# Patient Record
Sex: FEMALE | Race: WHITE | Employment: OTHER | URBAN - METROPOLITAN AREA
[De-identification: names, ages, dates, MRNs, and addresses within clinical notes are randomized per-mention and may not be internally consistent; named-entity substitution may affect disease eponyms.]

---

## 2019-03-13 ENCOUNTER — OFFICE VISIT (OUTPATIENT)
Dept: FAMILY MEDICINE CLINIC | Age: 69
End: 2019-03-13
Payer: MEDICARE

## 2019-03-13 VITALS
OXYGEN SATURATION: 97 % | HEART RATE: 75 BPM | SYSTOLIC BLOOD PRESSURE: 164 MMHG | BODY MASS INDEX: 23.57 KG/M2 | WEIGHT: 133 LBS | HEIGHT: 63 IN | DIASTOLIC BLOOD PRESSURE: 82 MMHG | RESPIRATION RATE: 18 BRPM | TEMPERATURE: 97.4 F

## 2019-03-13 DIAGNOSIS — R00.2 PALPITATIONS: ICD-10-CM

## 2019-03-13 DIAGNOSIS — R03.0 ELEVATED BLOOD PRESSURE READING: ICD-10-CM

## 2019-03-13 DIAGNOSIS — B88.9 MITE INFESTATION: ICD-10-CM

## 2019-03-13 DIAGNOSIS — R49.0 DYSPHONIA: ICD-10-CM

## 2019-03-13 DIAGNOSIS — Q21.10 ASD (ATRIAL SEPTAL DEFECT): Primary | ICD-10-CM

## 2019-03-13 DIAGNOSIS — F41.9 ANXIETY DISORDER, UNSPECIFIED TYPE: ICD-10-CM

## 2019-03-13 PROCEDURE — 1101F PT FALLS ASSESS-DOCD LE1/YR: CPT | Performed by: FAMILY MEDICINE

## 2019-03-13 PROCEDURE — G8427 DOCREV CUR MEDS BY ELIG CLIN: HCPCS | Performed by: FAMILY MEDICINE

## 2019-03-13 PROCEDURE — G8420 CALC BMI NORM PARAMETERS: HCPCS | Performed by: FAMILY MEDICINE

## 2019-03-13 PROCEDURE — 3017F COLORECTAL CA SCREEN DOC REV: CPT | Performed by: FAMILY MEDICINE

## 2019-03-13 PROCEDURE — 4040F PNEUMOC VAC/ADMIN/RCVD: CPT | Performed by: FAMILY MEDICINE

## 2019-03-13 PROCEDURE — 1036F TOBACCO NON-USER: CPT | Performed by: FAMILY MEDICINE

## 2019-03-13 PROCEDURE — 99214 OFFICE O/P EST MOD 30 MIN: CPT | Performed by: FAMILY MEDICINE

## 2019-03-13 PROCEDURE — G8484 FLU IMMUNIZE NO ADMIN: HCPCS | Performed by: FAMILY MEDICINE

## 2019-03-13 PROCEDURE — G8400 PT W/DXA NO RESULTS DOC: HCPCS | Performed by: FAMILY MEDICINE

## 2019-03-13 PROCEDURE — 1123F ACP DISCUSS/DSCN MKR DOCD: CPT | Performed by: FAMILY MEDICINE

## 2019-03-13 PROCEDURE — 1090F PRES/ABSN URINE INCON ASSESS: CPT | Performed by: FAMILY MEDICINE

## 2019-03-13 ASSESSMENT — PATIENT HEALTH QUESTIONNAIRE - PHQ9
SUM OF ALL RESPONSES TO PHQ QUESTIONS 1-9: 0
2. FEELING DOWN, DEPRESSED OR HOPELESS: 0
SUM OF ALL RESPONSES TO PHQ QUESTIONS 1-9: 0
1. LITTLE INTEREST OR PLEASURE IN DOING THINGS: 0
SUM OF ALL RESPONSES TO PHQ9 QUESTIONS 1 & 2: 0

## 2019-03-16 PROBLEM — F41.9 ANXIETY DISORDER: Status: ACTIVE | Noted: 2019-03-16

## 2019-03-16 PROBLEM — B88.9 MITE INFESTATION: Status: ACTIVE | Noted: 2019-03-16

## 2019-03-20 ENCOUNTER — HOSPITAL ENCOUNTER (OUTPATIENT)
Age: 69
Discharge: HOME | End: 2019-03-20
Payer: MEDICARE

## 2019-03-20 DIAGNOSIS — F41.9 ANXIETY DISORDER, UNSPECIFIED TYPE: ICD-10-CM

## 2019-03-20 DIAGNOSIS — R03.0 ELEVATED BLOOD PRESSURE READING: ICD-10-CM

## 2019-03-20 LAB
ALBUMIN SERPL-MCNC: 4.4 G/DL (ref 3.5–5.2)
ALP BLD-CCNC: 90 U/L (ref 35–104)
ALT SERPL-CCNC: 29 U/L (ref 0–32)
ANION GAP SERPL CALCULATED.3IONS-SCNC: 9 MMOL/L (ref 7–16)
AST SERPL-CCNC: 29 U/L (ref 0–31)
BASOPHILS ABSOLUTE: 0.03 E9/L (ref 0–0.2)
BASOPHILS RELATIVE PERCENT: 0.6 % (ref 0–2)
BILIRUB SERPL-MCNC: 0.4 MG/DL (ref 0–1.2)
BUN BLDV-MCNC: 10 MG/DL (ref 8–23)
CALCIUM SERPL-MCNC: 9.4 MG/DL (ref 8.6–10.2)
CHLORIDE BLD-SCNC: 105 MMOL/L (ref 98–107)
CO2: 27 MMOL/L (ref 22–29)
CREAT SERPL-MCNC: 0.8 MG/DL (ref 0.5–1)
EOSINOPHILS ABSOLUTE: 0.11 E9/L (ref 0.05–0.5)
EOSINOPHILS RELATIVE PERCENT: 2.2 % (ref 0–6)
GFR AFRICAN AMERICAN: >60
GFR NON-AFRICAN AMERICAN: >60 ML/MIN/1.73
GLUCOSE BLD-MCNC: 98 MG/DL (ref 74–99)
HCT VFR BLD CALC: 42.4 % (ref 34–48)
HEMOGLOBIN: 13.6 G/DL (ref 11.5–15.5)
IMMATURE GRANULOCYTES #: 0.01 E9/L
IMMATURE GRANULOCYTES %: 0.2 % (ref 0–5)
LYMPHOCYTES ABSOLUTE: 1.88 E9/L (ref 1.5–4)
LYMPHOCYTES RELATIVE PERCENT: 37.1 % (ref 20–42)
MCH RBC QN AUTO: 29.8 PG (ref 26–35)
MCHC RBC AUTO-ENTMCNC: 32.1 % (ref 32–34.5)
MCV RBC AUTO: 93 FL (ref 80–99.9)
MONOCYTES ABSOLUTE: 0.35 E9/L (ref 0.1–0.95)
MONOCYTES RELATIVE PERCENT: 6.9 % (ref 2–12)
NEUTROPHILS ABSOLUTE: 2.69 E9/L (ref 1.8–7.3)
NEUTROPHILS RELATIVE PERCENT: 53 % (ref 43–80)
PDW BLD-RTO: 13.2 FL (ref 11.5–15)
PLATELET # BLD: 206 E9/L (ref 130–450)
PMV BLD AUTO: 8 FL (ref 7–12)
POTASSIUM SERPL-SCNC: 5 MMOL/L (ref 3.5–5)
RBC # BLD: 4.56 E12/L (ref 3.5–5.5)
SODIUM BLD-SCNC: 141 MMOL/L (ref 132–146)
TOTAL PROTEIN: 7.4 G/DL (ref 6.4–8.3)
TSH SERPL DL<=0.05 MIU/L-ACNC: 2.08 UIU/ML (ref 0.27–4.2)
WBC # BLD: 5.1 E9/L (ref 4.5–11.5)

## 2019-03-20 PROCEDURE — 85025 COMPLETE CBC W/AUTO DIFF WBC: CPT

## 2019-03-20 PROCEDURE — 80053 COMPREHEN METABOLIC PANEL: CPT

## 2019-03-20 PROCEDURE — 84443 ASSAY THYROID STIM HORMONE: CPT

## 2019-03-20 PROCEDURE — 36415 COLL VENOUS BLD VENIPUNCTURE: CPT

## 2019-03-29 ENCOUNTER — OFFICE VISIT (OUTPATIENT)
Dept: FAMILY MEDICINE CLINIC | Age: 69
End: 2019-03-29
Payer: MEDICARE

## 2019-03-29 VITALS
SYSTOLIC BLOOD PRESSURE: 126 MMHG | HEIGHT: 63 IN | WEIGHT: 132 LBS | HEART RATE: 71 BPM | OXYGEN SATURATION: 97 % | RESPIRATION RATE: 14 BRPM | DIASTOLIC BLOOD PRESSURE: 60 MMHG | BODY MASS INDEX: 23.39 KG/M2 | TEMPERATURE: 97 F

## 2019-03-29 DIAGNOSIS — R03.0 ELEVATED BLOOD PRESSURE READING: Primary | ICD-10-CM

## 2019-03-29 PROCEDURE — G8400 PT W/DXA NO RESULTS DOC: HCPCS | Performed by: FAMILY MEDICINE

## 2019-03-29 PROCEDURE — 4040F PNEUMOC VAC/ADMIN/RCVD: CPT | Performed by: FAMILY MEDICINE

## 2019-03-29 PROCEDURE — 99213 OFFICE O/P EST LOW 20 MIN: CPT | Performed by: FAMILY MEDICINE

## 2019-03-29 PROCEDURE — 3017F COLORECTAL CA SCREEN DOC REV: CPT | Performed by: FAMILY MEDICINE

## 2019-03-29 PROCEDURE — 1090F PRES/ABSN URINE INCON ASSESS: CPT | Performed by: FAMILY MEDICINE

## 2019-03-29 PROCEDURE — G8420 CALC BMI NORM PARAMETERS: HCPCS | Performed by: FAMILY MEDICINE

## 2019-03-29 PROCEDURE — G8427 DOCREV CUR MEDS BY ELIG CLIN: HCPCS | Performed by: FAMILY MEDICINE

## 2019-03-29 PROCEDURE — 1036F TOBACCO NON-USER: CPT | Performed by: FAMILY MEDICINE

## 2019-03-29 PROCEDURE — G8484 FLU IMMUNIZE NO ADMIN: HCPCS | Performed by: FAMILY MEDICINE

## 2019-03-29 PROCEDURE — 1123F ACP DISCUSS/DSCN MKR DOCD: CPT | Performed by: FAMILY MEDICINE

## 2019-03-29 NOTE — PROGRESS NOTES
3/29/2019    Chief Complaint   Patient presents with    Hypertension     2 week recheck  blood pressure is better   review blood work              Patient Active Problem List    Diagnosis Date Noted    Mite infestation 03/16/2019    Anxiety disorder 03/16/2019    Palpitations     ASD (atrial septal defect) 04/01/2016     Overview Note:     echo + shunting R to L      Dysphonia 11/01/2014    Elevated blood pressure reading 11/01/2014     Overview Note:     Updating deleted diagnoses       Subjective  Has been watching salt intak and walking  Checking bp at home  Will b ring in bp cuff to check with ours    1. Elevated blood pressure reading        Review of Systems   Constitutional: Negative for activity change, appetite change, diaphoresis and fatigue. HENT: Negative. Eyes: Negative. Respiratory: Negative. Cardiovascular: Negative. Gastrointestinal: Negative. Endocrine: Negative. Genitourinary: Negative. Musculoskeletal: Negative. Skin: Negative. Some recurrence of itching     Neurological: Negative. Hematological: Negative. Psychiatric/Behavioral: Negative.                  Objective:    Family History   Problem Relation Age of Onset   [facility] Cancer Mother     Other Father     Cancer Brother        Past Medical History:   Diagnosis Date    ASD (atrial septal defect) april 2016    echo + shunting R to L    Palpitations        Social History     Socioeconomic History    Marital status:      Spouse name: Not on file    Number of children: Not on file    Years of education: Not on file    Highest education level: Not on file   Occupational History    Not on file   Social Needs    Financial resource strain: Not on file    Food insecurity:     Worry: Not on file     Inability: Not on file    Transportation needs:     Medical: Not on file     Non-medical: Not on file   Tobacco Use    Smoking status: Never Smoker    Smokeless tobacco: Never Used   Substance and Sexual Activity    Alcohol use: No     Alcohol/week: 0.0 oz     Binge frequency: Never    Drug use: No    Sexual activity: Not on file   Lifestyle    Physical activity:     Days per week: Not on file     Minutes per session: Not on file    Stress: Not on file   Relationships    Social connections:     Talks on phone: Not on file     Gets together: Not on file     Attends Buddhist service: Not on file     Active member of club or organization: Not on file     Attends meetings of clubs or organizations: Not on file     Relationship status: Not on file    Intimate partner violence:     Fear of current or ex partner: Not on file     Emotionally abused: Not on file     Physically abused: Not on file     Forced sexual activity: Not on file   Other Topics Concern    Not on file   Social History Narrative    Not on file           Scheduled Meds:     Current Outpatient Medications   Medication Sig Dispense Refill    GRAPE SEED EXTRACT PO Take by mouth daily Last dose 10/16/2016      Arginine 1000 MG TABS Take by mouth daily Last dose 10/16/2016      Coenzyme Q10 (CO Q 10 PO) Take by mouth daily Last dose 10/16/2016      Nutritional Supplements (VITAMIN D MAINTENANCE PO) Take by mouth daily Last dose 10/16/2016      Ascorbic Acid (VITAMIN C) 500 MG CAPS Take by mouth daily Last dose 10/16/2016      Cyanocobalamin (VITAMIN B 12 PO) Take by mouth daily Last dose 10/16/2016       No current facility-administered medications for this visit. Wt Readings from Last 3 Encounters:   03/29/19 132 lb (59.9 kg)   03/13/19 133 lb (60.3 kg)   10/19/16 130 lb (59 kg)         Vitals:    03/29/19 1047   BP: 126/60   Pulse: 71   Resp: 14   Temp: 97 °F (36.1 °C)   SpO2: 97%         Physical Exam   Constitutional: She is oriented to person, place, and time. She appears well-developed and well-nourished. No distress. HENT:   Head: Normocephalic. Eyes: Pupils are equal, round, and reactive to light.    Neck: Normal range of motion. Cardiovascular: Normal rate and regular rhythm. Pulmonary/Chest: Effort normal and breath sounds normal.   Abdominal: Soft. Bowel sounds are normal.   Neurological: She is alert and oriented to person, place, and time. Skin: Skin is warm. No rash noted. P         Assessment:    1. Elevated blood pressure reading  Bring in cuff next week  Cont diet and exercise                      4. Reviewed labs    5. No follow-ups on file.            Katina Hector M.D.    3/29/2019

## 2019-04-02 ASSESSMENT — ENCOUNTER SYMPTOMS
RESPIRATORY NEGATIVE: 1
GASTROINTESTINAL NEGATIVE: 1
EYES NEGATIVE: 1

## 2019-05-31 ENCOUNTER — OFFICE VISIT (OUTPATIENT)
Dept: FAMILY MEDICINE CLINIC | Age: 69
End: 2019-05-31
Payer: MEDICARE

## 2019-05-31 VITALS
HEIGHT: 63 IN | RESPIRATION RATE: 18 BRPM | DIASTOLIC BLOOD PRESSURE: 80 MMHG | BODY MASS INDEX: 23.74 KG/M2 | TEMPERATURE: 97 F | WEIGHT: 134 LBS | OXYGEN SATURATION: 97 % | HEART RATE: 71 BPM | SYSTOLIC BLOOD PRESSURE: 138 MMHG

## 2019-05-31 DIAGNOSIS — R03.0 ELEVATED BLOOD PRESSURE READING: Primary | ICD-10-CM

## 2019-05-31 DIAGNOSIS — Q21.12 PFO (PATENT FORAMEN OVALE): ICD-10-CM

## 2019-05-31 PROBLEM — I36.1 NON-RHEUMATIC TRICUSPID VALVE INSUFFICIENCY: Status: ACTIVE | Noted: 2019-05-31

## 2019-05-31 PROCEDURE — 3017F COLORECTAL CA SCREEN DOC REV: CPT | Performed by: FAMILY MEDICINE

## 2019-05-31 PROCEDURE — G8420 CALC BMI NORM PARAMETERS: HCPCS | Performed by: FAMILY MEDICINE

## 2019-05-31 PROCEDURE — 1036F TOBACCO NON-USER: CPT | Performed by: FAMILY MEDICINE

## 2019-05-31 PROCEDURE — G8400 PT W/DXA NO RESULTS DOC: HCPCS | Performed by: FAMILY MEDICINE

## 2019-05-31 PROCEDURE — 1090F PRES/ABSN URINE INCON ASSESS: CPT | Performed by: FAMILY MEDICINE

## 2019-05-31 PROCEDURE — 99213 OFFICE O/P EST LOW 20 MIN: CPT | Performed by: FAMILY MEDICINE

## 2019-05-31 PROCEDURE — 4040F PNEUMOC VAC/ADMIN/RCVD: CPT | Performed by: FAMILY MEDICINE

## 2019-05-31 PROCEDURE — 1123F ACP DISCUSS/DSCN MKR DOCD: CPT | Performed by: FAMILY MEDICINE

## 2019-05-31 PROCEDURE — G8427 DOCREV CUR MEDS BY ELIG CLIN: HCPCS | Performed by: FAMILY MEDICINE

## 2019-05-31 NOTE — PROGRESS NOTES
Not on file     Gets together: Not on file     Attends Yazidi service: Not on file     Active member of club or organization: Not on file     Attends meetings of clubs or organizations: Not on file     Relationship status: Not on file    Intimate partner violence:     Fear of current or ex partner: Not on file     Emotionally abused: Not on file     Physically abused: Not on file     Forced sexual activity: Not on file   Other Topics Concern    Not on file   Social History Narrative    Not on file           Scheduled Meds:     Current Outpatient Medications   Medication Sig Dispense Refill    GRAPE SEED EXTRACT PO Take by mouth daily Last dose 10/16/2016      Arginine 1000 MG TABS Take by mouth daily Last dose 10/16/2016      Coenzyme Q10 (CO Q 10 PO) Take by mouth daily Last dose 10/16/2016      Nutritional Supplements (VITAMIN D MAINTENANCE PO) Take by mouth daily Last dose 10/16/2016      Ascorbic Acid (VITAMIN C) 500 MG CAPS Take by mouth daily Last dose 10/16/2016      Cyanocobalamin (VITAMIN B 12 PO) Take by mouth daily Last dose 10/16/2016       No current facility-administered medications for this visit. Wt Readings from Last 3 Encounters:   05/31/19 134 lb (60.8 kg)   03/29/19 132 lb (59.9 kg)   03/13/19 133 lb (60.3 kg)         Vitals:    05/31/19 1036   BP: 138/80   Pulse: 71   Resp: 18   Temp: 97 °F (36.1 °C)   SpO2: 97%         Physical Exam    Physical Exam:    General: No obesity, no cachexia  Skin:    Warm and dry. Not pale, not flushed , no lesions, No rash , no bruises  HEENT:   PERRLA, EOMI. Conjunctiva clear, no  Drainage, no jaundice, ext canals normal ,no cerumenimpaction,TM's normal ,l, throat no injected, no cobblestoning, no drainage , tonsils normal no exudates  Neck:    Supple,No JVD, No thyromegaly, No carotid bruit, no adenopathy  Cardiac:   PMI Normal,RRR, No gallop , ++3/6  murmur.  No S3, no S4, no abd aortic bruit, normal pulses, , no pedal edema  Lungs: CTA, symmetrical,  No wheezes no rales, Normal percussion, no use of accessory muscles,   Abdomen:  Normal bowel sounds, abd soft, non-tender, no rebound no guarding,no hepatomegaly, no splenomegaly, no hernia, no ascites, normal palpation  Extremities:   No clubbing, no edema no cyanosis. Pedal pulses ok  M/S:  Back normal,no kyphosis, no cva tenderness,  Head and neck normal rom, shoulders normal strength , , hips normal , gait normal , no cane or walker , no motor deficits, no clubbing, normal nails  Neurological: A & O x 3, Moves all extremities,  No gross neuro deficits ,normal DTR, normal memory, normal judgement/insight, normal affect                               Assessment:  She is going to follow upon this   Nothing since 2016    1. PFO (patent foramen ovale)      2. Elevated blood pressure reading  Cont with diet and esercise  No need for medication at this time                      4. Reviewed labs    5. Return in about 6 months (around 11/30/2019).            Efrain Martin M.D.    5/31/2019

## 2019-06-28 ENCOUNTER — OFFICE VISIT (OUTPATIENT)
Dept: FAMILY MEDICINE CLINIC | Age: 69
End: 2019-06-28
Payer: MEDICARE

## 2019-06-28 VITALS — TEMPERATURE: 97.1 F | HEART RATE: 70 BPM | OXYGEN SATURATION: 97 % | RESPIRATION RATE: 16 BRPM

## 2019-06-28 DIAGNOSIS — L25.9 CONTACT DERMATITIS, UNSPECIFIED CONTACT DERMATITIS TYPE, UNSPECIFIED TRIGGER: Primary | ICD-10-CM

## 2019-06-28 PROCEDURE — 3017F COLORECTAL CA SCREEN DOC REV: CPT | Performed by: PHYSICIAN ASSISTANT

## 2019-06-28 PROCEDURE — 4040F PNEUMOC VAC/ADMIN/RCVD: CPT | Performed by: PHYSICIAN ASSISTANT

## 2019-06-28 PROCEDURE — 1036F TOBACCO NON-USER: CPT | Performed by: PHYSICIAN ASSISTANT

## 2019-06-28 PROCEDURE — G8400 PT W/DXA NO RESULTS DOC: HCPCS | Performed by: PHYSICIAN ASSISTANT

## 2019-06-28 PROCEDURE — 1123F ACP DISCUSS/DSCN MKR DOCD: CPT | Performed by: PHYSICIAN ASSISTANT

## 2019-06-28 PROCEDURE — G8427 DOCREV CUR MEDS BY ELIG CLIN: HCPCS | Performed by: PHYSICIAN ASSISTANT

## 2019-06-28 PROCEDURE — G8420 CALC BMI NORM PARAMETERS: HCPCS | Performed by: PHYSICIAN ASSISTANT

## 2019-06-28 PROCEDURE — 1090F PRES/ABSN URINE INCON ASSESS: CPT | Performed by: PHYSICIAN ASSISTANT

## 2019-06-28 PROCEDURE — 99213 OFFICE O/P EST LOW 20 MIN: CPT | Performed by: PHYSICIAN ASSISTANT

## 2019-06-28 RX ORDER — PREDNISONE 20 MG/1
20 TABLET ORAL 2 TIMES DAILY
Qty: 10 TABLET | Refills: 0 | Status: SHIPPED | OUTPATIENT
Start: 2019-06-28 | End: 2019-07-03

## 2019-06-28 RX ORDER — TRIAMCINOLONE ACETONIDE 1 MG/G
CREAM TOPICAL
Qty: 45 G | Refills: 0 | Status: SHIPPED
Start: 2019-06-28 | End: 2021-10-18

## 2019-06-28 NOTE — PROGRESS NOTES
Chief Complaint:   Poison Ivy (weeding garden 2 days ago looks like bubbles red itchy  really bad on arms and starting on stomach )    History of Present Illness   Source of history provided by:  patient. Karl Lema is a 80 y.o. old female who has a past medical history of:   Past Medical History:   Diagnosis Date    ASD (atrial septal defect) april 2016    echo + shunting R to L    Palpitations    presents for complaint of a rash to bilateral forearms and right abdomen, which began 2 days ago after weeding her garden. Pt states the area is itchy, red, and has not noted streaking. She has tried a variety of homeopathic remedies/oils. Denies any fever, chills, HA, recent illness, myalgias, vomiting, or lethargy. ROS    Unless otherwise stated in this report or unable to obtain because of the patient's clinical or mental status as evidenced by the medical record, this patients's positive and negative responses for Review of Systems, constitutional, psych, eyes, ENT, cardiovascular, respiratory, gastrointestinal, neurological, genitourinary, musculoskeletal, integument systems and systems related to the presenting problem are either stated in the preceding or were not pertinent or were negative for the symptoms and/or complaints related to the medical problem. Past Surgical History:  has a past surgical history that includes hip surgery (Right); Total hip arthroplasty (Right); and transesophageal echocardiogram (10/19/2016). Social History:  reports that she has never smoked. She has never used smokeless tobacco. She reports that she does not drink alcohol or use drugs. Family History: family history includes Cancer in her brother and mother; Other in her father. Allergies: Patient has no known allergies.     Physical Exam         VS:  Pulse 70   Temp 97.1 °F (36.2 °C)   Resp 16   SpO2 97%    Oxygen Saturation Interpretation: Normal.    Constitutional:  Alert, development consistent with age.  HEENT:  NC/NT. Airway patent. Eyes:  PERRL, EOMI, no discharge. Ears:  TMs without perforation, injection, or bulging. External canals clear without exudate. Mouth:  Mucous membranes moist without lesions, tongue and gums normal.  Throat:  Pharynx without injection, exudate, or tonsillar hypertrophy. Airway patient. Neck:  Supple. No lymphadenopathy. Lungs:  Clear to auscultation and breath sounds equal.  Heart:  Regular rate and rhythm. Skin:  Normal turgor and appropriately dry to touch. Erythematous, linear vesicles on bilateral forearms. Erythematous macular papular rash noted to right abdomen. No TTP over same area. No excoriations, papules, pustules, or bullae noted. No drainage noted. No lymphangitic streaking. Neurological:  Orientation age-appropriate unless noted elseware. Motor functions intact. Lab / Imaging Results   (All laboratory and radiology results have been personally reviewed by myself)  Labs:    Imaging: All Radiology results interpreted by Radiologist unless otherwise noted. Assessment / Plan     Impression(s):  1. Contact dermatitis, unspecified contact dermatitis type, unspecified trigger      Disposition:  Disposition: Discharge to home    New Prescriptions    PREDNISONE (DELTASONE) 20 MG TABLET    Take 1 tablet by mouth 2 times daily for 5 days    TRIAMCINOLONE (KENALOG) 0.1 % CREAM    Apply topically to affected areas 2 times daily. Discussed red flag symptoms. To call or to ER if changes or worse. Pt advised to take all medications as directed.

## 2020-05-08 ENCOUNTER — TELEPHONE (OUTPATIENT)
Dept: CARDIOLOGY CLINIC | Age: 70
End: 2020-05-08

## 2020-05-08 NOTE — TELEPHONE ENCOUNTER
Patient called stating her \"heart is beating weird\" and she is having shortness of breath. Symptoms never really went away since she was seen 2016, they improved,  but seem to be worsening now. Would be a new patient again. Does not have any BP/P readings. Please advise.

## 2020-05-13 ENCOUNTER — NURSE ONLY (OUTPATIENT)
Dept: CARDIOLOGY CLINIC | Age: 70
End: 2020-05-13

## 2020-06-10 ENCOUNTER — TELEPHONE (OUTPATIENT)
Dept: CARDIOLOGY CLINIC | Age: 70
End: 2020-06-10

## 2020-06-11 NOTE — TELEPHONE ENCOUNTER
Normal rhythm, average HR normal, rare/occasional skipped heart beats. Please schedule office visit the week of 6/22-6/26 if that works for her.

## 2020-06-22 ENCOUNTER — OFFICE VISIT (OUTPATIENT)
Dept: CARDIOLOGY CLINIC | Age: 70
End: 2020-06-22
Payer: MEDICARE

## 2020-06-22 VITALS
DIASTOLIC BLOOD PRESSURE: 70 MMHG | HEART RATE: 93 BPM | HEIGHT: 63 IN | SYSTOLIC BLOOD PRESSURE: 130 MMHG | WEIGHT: 141.7 LBS | BODY MASS INDEX: 25.11 KG/M2 | TEMPERATURE: 97.6 F | RESPIRATION RATE: 16 BRPM

## 2020-06-22 PROCEDURE — G8417 CALC BMI ABV UP PARAM F/U: HCPCS | Performed by: INTERNAL MEDICINE

## 2020-06-22 PROCEDURE — G8427 DOCREV CUR MEDS BY ELIG CLIN: HCPCS | Performed by: INTERNAL MEDICINE

## 2020-06-22 PROCEDURE — 1123F ACP DISCUSS/DSCN MKR DOCD: CPT | Performed by: INTERNAL MEDICINE

## 2020-06-22 PROCEDURE — 99215 OFFICE O/P EST HI 40 MIN: CPT | Performed by: INTERNAL MEDICINE

## 2020-06-22 PROCEDURE — 1090F PRES/ABSN URINE INCON ASSESS: CPT | Performed by: INTERNAL MEDICINE

## 2020-06-22 PROCEDURE — 3017F COLORECTAL CA SCREEN DOC REV: CPT | Performed by: INTERNAL MEDICINE

## 2020-06-22 PROCEDURE — 1036F TOBACCO NON-USER: CPT | Performed by: INTERNAL MEDICINE

## 2020-06-22 PROCEDURE — 4040F PNEUMOC VAC/ADMIN/RCVD: CPT | Performed by: INTERNAL MEDICINE

## 2020-06-22 PROCEDURE — 93000 ELECTROCARDIOGRAM COMPLETE: CPT | Performed by: INTERNAL MEDICINE

## 2020-06-22 PROCEDURE — G8400 PT W/DXA NO RESULTS DOC: HCPCS | Performed by: INTERNAL MEDICINE

## 2020-06-22 RX ORDER — METOPROLOL SUCCINATE 25 MG/1
25 TABLET, EXTENDED RELEASE ORAL 2 TIMES DAILY
Qty: 60 TABLET | Refills: 11 | Status: SHIPPED | OUTPATIENT
Start: 2020-06-22

## 2020-06-22 NOTE — PROGRESS NOTES
OFICE FOLLOW-UP    Name: Tamir Rush    Age: 77 y.o. Date of Service: 6/22/2020    Chief Complaint: Follow-up for palpitations, PFO, newly diagnosed atrial fibrillation    Referring Physician: Joanie Oconnell MD    History of Present Illness:   She reports a > 1 month history of intermittent palpitations/\"heart racing\" --> SR with rare ectopy on 5/2020 Holter --> rate controlled atrial fibrillation on EKG today (newly diagnosed today). No chest pain, orthopnea, or syncope.  She is currently with no acute distress at rest.    Review of Systems:  Cardiac: As per HPI  General: No fever, chills  Pulmonary: As per HPI  HEENT: No visual disturbances, difficult swallowing  GI: No nausea, vomiting, abdominal pain  : No dysuria or hematuria  Musculoskeletal: LANDERS x 4, no arthritis  Skin: Intact, no rashes  Neuro/Psych: No headache, dementia, seizures    Past Medical History:  Past Medical History:   Diagnosis Date    ASD (atrial septal defect) april 2016    echo + shunting R to L    Palpitations    Valve disease  PHTN  Atrial fibrillation    Past Surgical History:  No prior cardiac surgeries    Family History:  No [address] of premature CAD    Social History:  Social History     Socioeconomic History    Marital status:      Spouse name: Not on file    Number of children: Not on file    Years of education: Not on file    Highest education level: Not on file   Occupational History    Not on file   Social Needs    Financial resource strain: Not on file    Food insecurity     Worry: Not on file     Inability: Not on file    Transportation needs     Medical: Not on file     Non-medical: Not on file   Tobacco Use    Smoking status: Never Smoker    Smokeless tobacco: Never Used   Substance and Sexual Activity    Alcohol use: No     Alcohol/week: 0.0 standard drinks     Binge frequency: Never    Drug use: No    Sexual activity: Not on file   Lifestyle    Physical activity     Days per week: Not on file auscultation bilaterally. No wheezes, rales, or rhonchi. Cardiac: IRRR, no murmurs apparent  Abdomen: Soft, nontender, +bowel sounds  Extremities: Moves all extremities x 4, no lower extremity edema  Neurologic: No focal sensory or motor deficits apparent, normal mood and affect    Lab Results   Component Value Date    WBC 5.1 03/20/2019    HGB 13.6 03/20/2019    HCT 42.4 03/20/2019    MCV 93.0 03/20/2019     03/20/2019     Lab Results   Component Value Date     03/20/2019    K 5.0 03/20/2019     03/20/2019    CO2 27 03/20/2019    BUN 10 03/20/2019    CREATININE 0.8 03/20/2019    GLUCOSE 98 03/20/2019    CALCIUM 9.4 03/20/2019      Lab Results   Component Value Date    TSH 2.080 03/20/2019     Cardiac Tests:  ECG: atrial fibrillation/flutter, rate 93, NSSTT changes    Echocardiogram: 4/14/16  Normal left ventricular ejection fraction. Mild concentric left ventricular hypertrophy. Normal right ventricular size and function. There is doppler evidence of stage I diastolic dysfunction. Aneurysmal interatrial septum. Bidirectional interatrial shunting present. Left to right interatrial shunting present on color flow. Positive bubble study with large degree of right to left interatrial shunting. Mild mitral regurgitation. Mild tricuspid regurgitation. PASP is estimated at 39 mmHg. The inferior vena cava is normal in size with normal respiratory variation. TAMARA: 10/19/16   Normal left ventricular ejection fraction. Normal right ventricular function. PFO with interatrial shunting. No evidence of a left atrial appendage thrombus. Prominent eustachian valve noted. Mild-moderate mitral regurgitation. Moderate tricuspid regurgitation. RV-RA gradient is estimated at 51 mmHg. Mild pulmonic regurgitation. ASSESSMENT / PLAN:  1. Newly diagnosed atrial fibrillation (6/22/2020) -- rate controlled, [id number] score at least 2  2.  Long-standing history of

## 2020-07-10 ENCOUNTER — OFFICE VISIT (OUTPATIENT)
Dept: CARDIOLOGY CLINIC | Age: 70
End: 2020-07-10
Payer: MEDICARE

## 2020-07-10 VITALS
DIASTOLIC BLOOD PRESSURE: 80 MMHG | SYSTOLIC BLOOD PRESSURE: 138 MMHG | WEIGHT: 137.9 LBS | RESPIRATION RATE: 16 BRPM | BODY MASS INDEX: 25.38 KG/M2 | HEIGHT: 62 IN | HEART RATE: 66 BPM

## 2020-07-10 PROCEDURE — 4040F PNEUMOC VAC/ADMIN/RCVD: CPT | Performed by: INTERNAL MEDICINE

## 2020-07-10 PROCEDURE — 99214 OFFICE O/P EST MOD 30 MIN: CPT | Performed by: INTERNAL MEDICINE

## 2020-07-10 PROCEDURE — 1090F PRES/ABSN URINE INCON ASSESS: CPT | Performed by: INTERNAL MEDICINE

## 2020-07-10 PROCEDURE — 1123F ACP DISCUSS/DSCN MKR DOCD: CPT | Performed by: INTERNAL MEDICINE

## 2020-07-10 PROCEDURE — 1036F TOBACCO NON-USER: CPT | Performed by: INTERNAL MEDICINE

## 2020-07-10 PROCEDURE — G8417 CALC BMI ABV UP PARAM F/U: HCPCS | Performed by: INTERNAL MEDICINE

## 2020-07-10 PROCEDURE — 3017F COLORECTAL CA SCREEN DOC REV: CPT | Performed by: INTERNAL MEDICINE

## 2020-07-10 PROCEDURE — G8400 PT W/DXA NO RESULTS DOC: HCPCS | Performed by: INTERNAL MEDICINE

## 2020-07-10 PROCEDURE — 93000 ELECTROCARDIOGRAM COMPLETE: CPT | Performed by: INTERNAL MEDICINE

## 2020-07-10 PROCEDURE — G8427 DOCREV CUR MEDS BY ELIG CLIN: HCPCS | Performed by: INTERNAL MEDICINE

## 2020-07-10 NOTE — PROGRESS NOTES
OFICE FOLLOW-UP    Name: Alexi Rodriguez    Age: 77 y.o. Date of Service: 7/10/2020    Chief Complaint: Follow-up for palpitations, PFO, paroxysmal atrial fibrillation    Referring Physician: Milena Smith MD    History of Present Illness: At the time of her 6/22/2020 office visit, she reported a > 1 month history of intermittent palpitations/\"heart racing\" --> SR with rare ectopy on 5/2020 Holter --> rate controlled atrial fibrillation on 6/22/2020 EKG (newly diagnosed at that time) --> BB added --> SR on EKG today. No chest pain, worsening SOB, orthopnea, or syncope.  She is currently with no acute distress at rest.    Review of Systems:  Cardiac: As per HPI  General: No fever, chills  Pulmonary: As per HPI  HEENT: No visual disturbances, difficult swallowing  GI: No nausea, vomiting, abdominal pain  : No dysuria or hematuria  Musculoskeletal: LANDERS x 4, no arthritis  Skin: Intact, no rashes  Neuro/Psych: No headache, dementia, seizures    Past Medical History:  Past Medical History:   Diagnosis Date    ASD (atrial septal defect) april 2016    echo + shunting R to L    Palpitations    Valve disease  PHTN  Atrial fibrillation    Past Surgical History:  No prior cardiac surgeries    Family History:  No [address] of premature CAD    Social History:  Social History     Socioeconomic History    Marital status:      Spouse name: Not on file    Number of children: Not on file    Years of education: Not on file    Highest education level: Not on file   Occupational History    Not on file   Social Needs    Financial resource strain: Not on file    Food insecurity     Worry: Not on file     Inability: Not on file    Transportation needs     Medical: Not on file     Non-medical: Not on file   Tobacco Use    Smoking status: Never Smoker    Smokeless tobacco: Never Used   Substance and Sexual Activity    Alcohol use: No     Alcohol/week: 0.0 standard drinks     Binge frequency: Never    Drug use: No    Sexual activity: Not on file   Lifestyle    Physical activity     Days per week: Not on file     Minutes per session: Not on file    Stress: Not on file   Relationships    Social connections     Talks on phone: Not on file     Gets together: Not on file     Attends Hoahaoism service: Not on file     Active member of club or organization: Not on file     Attends meetings of clubs or organizations: Not on file     Relationship status: Not on file    Intimate partner violence     Fear of current or ex partner: Not on file     Emotionally abused: Not on file     Physically abused: Not on file     Forced sexual activity: Not on file   Other Topics Concern    Not on file   Social History Narrative    Not on file       Allergies:  No Known Allergies    Current Medications:  Current Outpatient Medications   Medication Sig Dispense Refill    AMINO ACIDS COMPLEX PO Take by mouth daily      metoprolol succinate (TOPROL XL) 25 MG extended release tablet Take 1 tablet by mouth 2 times daily 60 tablet 11    GRAPE SEED EXTRACT PO Take by mouth daily Last dose 10/16/2016      Arginine 1000 MG TABS Take by mouth daily Last dose 10/16/2016      Coenzyme Q10 (CO Q 10 PO) Take by mouth daily Last dose 10/16/2016      Nutritional Supplements (VITAMIN D MAINTENANCE PO) Take by mouth daily Last dose 10/16/2016      Ascorbic Acid (VITAMIN C) 1000 MG tablet Take by mouth daily Last dose 10/16/2016      Cyanocobalamin (VITAMIN B 12 PO) Take by mouth daily Last dose 10/16/2016      triamcinolone (KENALOG) 0.1 % cream Apply topically to affected areas 2 times daily. (Patient not taking: Reported on 6/22/2020) 45 g 0     No current facility-administered medications for this visit.       Physical Exam:  /80   Pulse 66   Resp 16   Ht 5' 2\" (1.575 m)   Wt 137 lb 14.4 oz (62.6 kg)   BMI 25.22 kg/m²   Wt Readings from Last 3 Encounters:   07/10/20 137 lb 14.4 oz (62.6 kg)   06/22/20 141 lb 11.2 oz (64.3 kg)   05/31/19 134 lb (60.8 kg)     Appearance: Awake, alert and oriented x 3, no acute respiratory distress  Skin: Intact, no rash  Head: Normocephalic, atraumatic  Eyes: EOMI, no conjunctival erythema  ENMT: No pharyngeal erythema, MMM, no rhinorrhea  Neck: Supple, no elevated JVP, no carotid bruits  Lungs: Clear to auscultation bilaterally. No wheezes, rales, or rhonchi. Cardiac: RRR, 2/6 systolic murmur  Abdomen: Soft, nontender, +bowel sounds  Extremities: Moves all extremities x 4, no lower extremity edema  Neurologic: No focal sensory or motor deficits apparent, normal mood and affect    Lab Results   Component Value Date    WBC 5.1 03/20/2019    HGB 13.6 03/20/2019    HCT 42.4 03/20/2019    MCV 93.0 03/20/2019     03/20/2019     Lab Results   Component Value Date     03/20/2019    K 5.0 03/20/2019     03/20/2019    CO2 27 03/20/2019    BUN 10 03/20/2019    CREATININE 0.8 03/20/2019    GLUCOSE 98 03/20/2019    CALCIUM 9.4 03/20/2019      Lab Results   Component Value Date    TSH 2.080 03/20/2019     Cardiac Tests:  ECG: SR, rate 66, NSSTT changes    Echocardiogram: 4/14/16  Normal left ventricular ejection fraction. Mild concentric left ventricular hypertrophy. Normal right ventricular size and function. There is doppler evidence of stage I diastolic dysfunction. Aneurysmal interatrial septum. Bidirectional interatrial shunting present. Left to right interatrial shunting present on color flow. Positive bubble study with large degree of right to left interatrial shunting. Mild mitral regurgitation. Mild tricuspid regurgitation. PASP is estimated at 39 mmHg. The inferior vena cava is normal in size with normal respiratory variation. TAMARA: 10/19/16   Normal left ventricular ejection fraction. Normal right ventricular function. PFO with interatrial shunting. No evidence of a left atrial appendage thrombus. Prominent eustachian valve noted. Mild-moderate mitral regurgitation.    Moderate

## 2020-07-27 NOTE — PROGRESS NOTES
Elevated blood pressure reading 11/01/2014     Overview Note:     Updating deleted diagnoses         Past Medical History:   Diagnosis Date    ASD (atrial septal defect) april 2016    echo + shunting R to L    Palpitations        Family History   Problem Relation Age of Onset    Cancer Mother     Other Father     Cancer Brother        Social History     Tobacco Use    Smoking status: Never Smoker    Smokeless tobacco: Never Used   Substance Use Topics    Alcohol use: No     Alcohol/week: 0.0 standard drinks     Binge frequency: Never       Current Outpatient Medications   Medication Sig Dispense Refill    AMINO ACIDS COMPLEX PO Take by mouth daily      GRAPE SEED EXTRACT PO Take by mouth daily Last dose 10/16/2016      Arginine 1000 MG TABS Take by mouth daily Last dose 10/16/2016      Coenzyme Q10 (CO Q 10 PO) Take by mouth daily Last dose 10/16/2016      Nutritional Supplements (VITAMIN D MAINTENANCE PO) Take by mouth daily Last dose 10/16/2016      Ascorbic Acid (VITAMIN C) 1000 MG tablet Take by mouth daily Last dose 10/16/2016      Cyanocobalamin (VITAMIN B 12 PO) Take by mouth daily Last dose 10/16/2016      metoprolol succinate (TOPROL XL) 25 MG extended release tablet Take 1 tablet by mouth 2 times daily (Patient not taking: Reported on 7/29/2020) 60 tablet 11    triamcinolone (KENALOG) 0.1 % cream Apply topically to affected areas 2 times daily. (Patient not taking: Reported on 6/22/2020) 45 g 0     No current facility-administered medications for this visit. No Known Allergies    ROS:   Constitutional: Negative for fever, activity change and appetite change. HENT: Negative for epistaxis. Eyes: Negative for diploplia, blurred vision. Respiratory: Negative for cough, chest tightness, shortness of breath and wheezing. Cardiovascular: pertinent positives in HPI  Gastrointestinal: Negative for abdominal pain and blood in stool.    All other review of systems are negative significant mitral stenosis. Tricuspid Valve   Mild tricuspid regurgitation. PASP is estimated at 39 mmHg. Aortic Valve   No evidence of hemodynamically significant aortic regurgitation or   stenosis. Pulmonic Valve   Mild pulmonic regurgitation. Pericardial Effusion   There is a trivial pericardial effusion. Aorta   Aortic root dimension within normal limits. The inferior vena cava is normal in size with normal respiratory   variation. Conclusions      Summary   Normal left ventricular ejection fraction. Mild concentric left ventricular hypertrophy. Normal right ventricular size and function. There is doppler evidence of stage I diastolic dysfunction. Aneurysmal interatrial septum. Bidirectional interatrial shunting present. Left to right interatrial shunting present on color flow. Positive bubble study with large degree of right to left interatrial   shunting. Mild mitral regurgitation. Mild tricuspid regurgitation. PASP is estimated at 39 mmHg. The inferior vena cava is normal in size with normal respiratory   variation. Signature      ----------------------------------------------------------------   Electronically signed by Sarahi Allred MD(Interpreting   physician) on 04/14/2016 04:45 PM   ----------------------------------------------------------------     M-Mode/2D Measurements & Calculations      LV Diastolic     LV Systolic Dimension: 2.4 cm   AV Cusp Separation: 1.5   Dimension: 4.4   LV Volume Diastolic: 81.2 ml    cmAO Root Dimension: 2.6   cm               LV Volume Systolic: 10.7 ml     cm   LV FS:45.5 %     LV EDV/LV EDV Index: 85.7 SW/92   LV PW Diastolic: DJ/Y^4CI ESV/LV ESV Index: 20.5   1 cm             ml/13ml/ m^2   LV PW Systolic:  EF Calculated: 76.1 %           RV Diastolic Dimension:   1.6 cm           LV Mass Index: 106 l/min*m^2    3. 2 cm   Septum   Diastolic: 1.2                                   LA/Aorta: 1.58   cm Ascending Aorta: 3 cm   Septum Systolic:                                 LA volume/Index: 51 ml   1.6 cm                                           /32ml/m^2                                                    RA Area: 17.1 cm^2   LV Mass: 168.92   g                                                IVC Expiration: 1.3 cm     Doppler Measurements & Calculations      MV Peak E-Wave:   AV Peak Velocity: 1.55 m/s   LVOT Peak Velocity: 0.73   0.52 m/s          AV Peak Gradient: 9.62 mmHg  m/s   MV Peak A-Wave:   AV Mean Velocity: 0.88 m/s   LVOT Mean Velocity: 0.43   0.77 m/s          AV Mean Gradient: 3.8 mmHg   m/s   MV E/A Ratio:     AV VTI: 27.4 cm              LVOT Peak Gradient: 2.1   0.68                                           mmHgLVOT Mean Gradient: 0.9   MV Peak Gradient: LVOT VTI: 14.8 cm            mmHg   1.1 mmHg   MV Mean Gradient: Estimated PADP: 4 mmHg   1 mmHg            Pulm. Vein A Reversal   MV Mean Velocity: Duration:145.3 msec          TR Velocity:3 m/s   0.45 m/s          Pulm. Vein D Velocity:0.54   TR Gradient:36.05 mmHg   MV Deceleration   m/sPulm. Vein A Reversal     PV Peak Velocity: 1.24 m/s   Time: 295.9 msec  Velocity:0.4 m/s             PV Peak Gradient: 6.16 mmHg   MV P1/2t: 100.8   Pulm. Vein S Velocity: 0.86  PV Mean Velocity: 0.81 m/s   msec              m/s                          PV Mean Gradient: 3 mmHg   MVA by PHT:2.18   cm^2                                           GA ED Velocity: 0.99 m/s      MV E' Septal   Velocity: 0.05   m/s   MV E' Lateral   Velocity: 0.09   m/s   MR Velocity: 5.22   m/s   MR VTI: 156.1 cm     TAMARA 10/19/16:  Findings      Left Ventricle   Left ventricle size is normal.   Normal left ventricular systolic function. Normal left ventricular ejection fraction. Right Ventricle   Normal right ventricular function. Left Atrium   Normal sized left atrium. PFO with interatrial shunting.    No evidence of a left atrial She was started on Toprol XL and Eliquis, but ultimately the patient stopped taking the medications due to fatigue, depression and loss of concentration. I urged/stressed to the patient about the importance of using a [facility] due to stroke risk. She verbalized and understands the risk of stroke while not on [facility]. - Thus far has had no cardioversion or ablation.  - Had an extensive discussion regarding options between rate and rhythm control and the patient has chosen rate control for now  - Rhythm controlled options were discussed including AAD therapy with Flecainide versus AF ablation. Patient defers bot now and would like to continue to observe. - Had an extensive discussion regarding all secondary causes of AF which include but are not limited to the following and then need for lifestyle modifications and stringent control of contributing medical conditions which include            - Weight Loss: aggressive weight loss and regular moderate cardiopulmonary exercise to maintain BMI <27 with a loss of at least 10% of their current weight which is safely and adequately maintained            - Exercise: 30min 3-4x/week of at least moderate cardiopulmonary exercise up to 250 min/wk            - Blood pressure: target of <130/80mmHg            - Dyslipidemia: add a statin if LDL >100mg/dL            - Diabetes Mellitus: add Metformin if HbA1c >6.5%            - Obstructive sleep apnea: evaluate for and or treat with CPAP if AHI >30/hour            - Abstinence from alcohol and tobacco products  - Discussed the potential improvement in all atrial fibrillation therapies if diet/exercise and weight loss are achieved per above. -  A detailed discussion was had regarding the risks, benefits, and alternatives to the atrial fibrillation ablation procedure. The procedure was described in detail.  I quoted the patient an overall 4-6% risk of major complications which include but are not limited to: bleeding, infection, blood clot,

## 2020-07-29 ENCOUNTER — OFFICE VISIT (OUTPATIENT)
Dept: NON INVASIVE DIAGNOSTICS | Age: 70
End: 2020-07-29
Payer: MEDICARE

## 2020-07-29 VITALS
DIASTOLIC BLOOD PRESSURE: 80 MMHG | BODY MASS INDEX: 23.57 KG/M2 | WEIGHT: 133 LBS | SYSTOLIC BLOOD PRESSURE: 136 MMHG | RESPIRATION RATE: 18 BRPM | HEIGHT: 63 IN

## 2020-07-29 PROCEDURE — 1123F ACP DISCUSS/DSCN MKR DOCD: CPT | Performed by: INTERNAL MEDICINE

## 2020-07-29 PROCEDURE — 4040F PNEUMOC VAC/ADMIN/RCVD: CPT | Performed by: INTERNAL MEDICINE

## 2020-07-29 PROCEDURE — 1090F PRES/ABSN URINE INCON ASSESS: CPT | Performed by: INTERNAL MEDICINE

## 2020-07-29 PROCEDURE — 3017F COLORECTAL CA SCREEN DOC REV: CPT | Performed by: INTERNAL MEDICINE

## 2020-07-29 PROCEDURE — G8400 PT W/DXA NO RESULTS DOC: HCPCS | Performed by: INTERNAL MEDICINE

## 2020-07-29 PROCEDURE — 99205 OFFICE O/P NEW HI 60 MIN: CPT | Performed by: INTERNAL MEDICINE

## 2020-07-29 PROCEDURE — 1036F TOBACCO NON-USER: CPT | Performed by: INTERNAL MEDICINE

## 2020-07-29 PROCEDURE — G8427 DOCREV CUR MEDS BY ELIG CLIN: HCPCS | Performed by: INTERNAL MEDICINE

## 2020-07-29 PROCEDURE — G8420 CALC BMI NORM PARAMETERS: HCPCS | Performed by: INTERNAL MEDICINE

## 2021-08-13 LAB
ANION GAP SERPL CALCULATED.3IONS-SCNC: 14 MMOL/L (ref 7–16)
BASOPHILS ABSOLUTE: 0.03 E9/L (ref 0–0.2)
BASOPHILS RELATIVE PERCENT: 0.6 % (ref 0–2)
BUN BLDV-MCNC: 16 MG/DL (ref 6–23)
CALCIUM SERPL-MCNC: 9.4 MG/DL (ref 8.6–10.2)
CHLORIDE BLD-SCNC: 103 MMOL/L (ref 98–107)
CO2: 21 MMOL/L (ref 22–29)
CREAT SERPL-MCNC: 0.6 MG/DL (ref 0.5–1)
EOSINOPHILS ABSOLUTE: 0.06 E9/L (ref 0.05–0.5)
EOSINOPHILS RELATIVE PERCENT: 1.3 % (ref 0–6)
GFR AFRICAN AMERICAN: >60
GFR NON-AFRICAN AMERICAN: >60 ML/MIN/1.73
GLUCOSE BLD-MCNC: 141 MG/DL (ref 74–99)
HCT VFR BLD CALC: 44.7 % (ref 34–48)
HEMOGLOBIN: 14.1 G/DL (ref 11.5–15.5)
IMMATURE GRANULOCYTES #: 0.01 E9/L
IMMATURE GRANULOCYTES %: 0.2 % (ref 0–5)
INR BLD: 1.2
LYMPHOCYTES ABSOLUTE: 1.62 E9/L (ref 1.5–4)
LYMPHOCYTES RELATIVE PERCENT: 33.9 % (ref 20–42)
MCH RBC QN AUTO: 29.2 PG (ref 26–35)
MCHC RBC AUTO-ENTMCNC: 31.5 % (ref 32–34.5)
MCV RBC AUTO: 92.5 FL (ref 80–99.9)
MONOCYTES ABSOLUTE: 0.39 E9/L (ref 0.1–0.95)
MONOCYTES RELATIVE PERCENT: 8.2 % (ref 2–12)
NEUTROPHILS ABSOLUTE: 2.67 E9/L (ref 1.8–7.3)
NEUTROPHILS RELATIVE PERCENT: 55.8 % (ref 43–80)
PDW BLD-RTO: 12.7 FL (ref 11.5–15)
PLATELET # BLD: 256 E9/L (ref 130–450)
PMV BLD AUTO: 8.4 FL (ref 7–12)
POTASSIUM SERPL-SCNC: 4.4 MMOL/L (ref 3.5–5)
PROTHROMBIN TIME: 12.9 SEC (ref 9.3–12.4)
RBC # BLD: 4.83 E12/L (ref 3.5–5.5)
SODIUM BLD-SCNC: 138 MMOL/L (ref 132–146)
WBC # BLD: 4.8 E9/L (ref 4.5–11.5)

## 2021-10-18 ENCOUNTER — OFFICE VISIT (OUTPATIENT)
Dept: FAMILY MEDICINE CLINIC | Age: 71
End: 2021-10-18
Payer: MEDICARE

## 2021-10-18 VITALS
SYSTOLIC BLOOD PRESSURE: 150 MMHG | DIASTOLIC BLOOD PRESSURE: 80 MMHG | OXYGEN SATURATION: 98 % | HEART RATE: 72 BPM | TEMPERATURE: 98.3 F | WEIGHT: 137.6 LBS | RESPIRATION RATE: 16 BRPM | BODY MASS INDEX: 24.38 KG/M2 | HEIGHT: 63 IN

## 2021-10-18 DIAGNOSIS — Z98.890 S/P ABLATION OF ATRIAL FIBRILLATION: ICD-10-CM

## 2021-10-18 DIAGNOSIS — Z98.890 S/P ABLATION OF ATRIAL FIBRILLATION: Primary | ICD-10-CM

## 2021-10-18 DIAGNOSIS — R03.0 ELEVATED BLOOD PRESSURE READING: ICD-10-CM

## 2021-10-18 DIAGNOSIS — Z86.79 S/P ABLATION OF ATRIAL FIBRILLATION: Primary | ICD-10-CM

## 2021-10-18 DIAGNOSIS — I48.0 PAF (PAROXYSMAL ATRIAL FIBRILLATION) (HCC): ICD-10-CM

## 2021-10-18 DIAGNOSIS — Z86.79 S/P ABLATION OF ATRIAL FIBRILLATION: ICD-10-CM

## 2021-10-18 LAB
ALBUMIN SERPL-MCNC: 4.4 G/DL (ref 3.5–5.2)
ALP BLD-CCNC: 101 U/L (ref 35–104)
ALT SERPL-CCNC: 14 U/L (ref 0–32)
ANION GAP SERPL CALCULATED.3IONS-SCNC: 14 MMOL/L (ref 7–16)
AST SERPL-CCNC: 20 U/L (ref 0–31)
BASOPHILS ABSOLUTE: 0.04 E9/L (ref 0–0.2)
BASOPHILS RELATIVE PERCENT: 0.7 % (ref 0–2)
BILIRUB SERPL-MCNC: 0.3 MG/DL (ref 0–1.2)
BUN BLDV-MCNC: 14 MG/DL (ref 6–23)
CALCIUM SERPL-MCNC: 9.5 MG/DL (ref 8.6–10.2)
CHLORIDE BLD-SCNC: 104 MMOL/L (ref 98–107)
CO2: 20 MMOL/L (ref 22–29)
CREAT SERPL-MCNC: 0.7 MG/DL (ref 0.5–1)
EOSINOPHILS ABSOLUTE: 0.09 E9/L (ref 0.05–0.5)
EOSINOPHILS RELATIVE PERCENT: 1.5 % (ref 0–6)
GFR AFRICAN AMERICAN: >60
GFR NON-AFRICAN AMERICAN: >60 ML/MIN/1.73
GLUCOSE BLD-MCNC: 88 MG/DL (ref 74–99)
HCT VFR BLD CALC: 43.6 % (ref 34–48)
HEMOGLOBIN: 14 G/DL (ref 11.5–15.5)
IMMATURE GRANULOCYTES #: 0.01 E9/L
IMMATURE GRANULOCYTES %: 0.2 % (ref 0–5)
LYMPHOCYTES ABSOLUTE: 2.07 E9/L (ref 1.5–4)
LYMPHOCYTES RELATIVE PERCENT: 34.4 % (ref 20–42)
MCH RBC QN AUTO: 28.9 PG (ref 26–35)
MCHC RBC AUTO-ENTMCNC: 32.1 % (ref 32–34.5)
MCV RBC AUTO: 89.9 FL (ref 80–99.9)
MONOCYTES ABSOLUTE: 0.51 E9/L (ref 0.1–0.95)
MONOCYTES RELATIVE PERCENT: 8.5 % (ref 2–12)
NEUTROPHILS ABSOLUTE: 3.29 E9/L (ref 1.8–7.3)
NEUTROPHILS RELATIVE PERCENT: 54.7 % (ref 43–80)
PDW BLD-RTO: 13.1 FL (ref 11.5–15)
PLATELET # BLD: 268 E9/L (ref 130–450)
PMV BLD AUTO: 8.4 FL (ref 7–12)
POTASSIUM SERPL-SCNC: 4.8 MMOL/L (ref 3.5–5)
RBC # BLD: 4.85 E12/L (ref 3.5–5.5)
SODIUM BLD-SCNC: 138 MMOL/L (ref 132–146)
TOTAL PROTEIN: 7 G/DL (ref 6.4–8.3)
TSH SERPL DL<=0.05 MIU/L-ACNC: 2.72 UIU/ML (ref 0.27–4.2)
WBC # BLD: 6 E9/L (ref 4.5–11.5)

## 2021-10-18 PROCEDURE — 3017F COLORECTAL CA SCREEN DOC REV: CPT | Performed by: PHYSICIAN ASSISTANT

## 2021-10-18 PROCEDURE — 1123F ACP DISCUSS/DSCN MKR DOCD: CPT | Performed by: PHYSICIAN ASSISTANT

## 2021-10-18 PROCEDURE — 1111F DSCHRG MED/CURRENT MED MERGE: CPT | Performed by: PHYSICIAN ASSISTANT

## 2021-10-18 PROCEDURE — 1036F TOBACCO NON-USER: CPT | Performed by: PHYSICIAN ASSISTANT

## 2021-10-18 PROCEDURE — 99213 OFFICE O/P EST LOW 20 MIN: CPT | Performed by: PHYSICIAN ASSISTANT

## 2021-10-18 PROCEDURE — 4040F PNEUMOC VAC/ADMIN/RCVD: CPT | Performed by: PHYSICIAN ASSISTANT

## 2021-10-18 PROCEDURE — G8420 CALC BMI NORM PARAMETERS: HCPCS | Performed by: PHYSICIAN ASSISTANT

## 2021-10-18 PROCEDURE — 1090F PRES/ABSN URINE INCON ASSESS: CPT | Performed by: PHYSICIAN ASSISTANT

## 2021-10-18 PROCEDURE — G8427 DOCREV CUR MEDS BY ELIG CLIN: HCPCS | Performed by: PHYSICIAN ASSISTANT

## 2021-10-18 PROCEDURE — G8484 FLU IMMUNIZE NO ADMIN: HCPCS | Performed by: PHYSICIAN ASSISTANT

## 2021-10-18 PROCEDURE — G8400 PT W/DXA NO RESULTS DOC: HCPCS | Performed by: PHYSICIAN ASSISTANT

## 2021-10-18 SDOH — ECONOMIC STABILITY: FOOD INSECURITY: WITHIN THE PAST 12 MONTHS, THE FOOD YOU BOUGHT JUST DIDN'T LAST AND YOU DIDN'T HAVE MONEY TO GET MORE.: NEVER TRUE

## 2021-10-18 SDOH — ECONOMIC STABILITY: FOOD INSECURITY: WITHIN THE PAST 12 MONTHS, YOU WORRIED THAT YOUR FOOD WOULD RUN OUT BEFORE YOU GOT MONEY TO BUY MORE.: NEVER TRUE

## 2021-10-18 ASSESSMENT — PATIENT HEALTH QUESTIONNAIRE - PHQ9
SUM OF ALL RESPONSES TO PHQ QUESTIONS 1-9: 0
2. FEELING DOWN, DEPRESSED OR HOPELESS: 0
SUM OF ALL RESPONSES TO PHQ QUESTIONS 1-9: 0
1. LITTLE INTEREST OR PLEASURE IN DOING THINGS: 0
SUM OF ALL RESPONSES TO PHQ9 QUESTIONS 1 & 2: 0
SUM OF ALL RESPONSES TO PHQ QUESTIONS 1-9: 0

## 2021-10-18 ASSESSMENT — ENCOUNTER SYMPTOMS
DIARRHEA: 0
SHORTNESS OF BREATH: 0
COUGH: 0
ABDOMINAL PAIN: 0
NAUSEA: 0
VOMITING: 0

## 2021-10-18 ASSESSMENT — LIFESTYLE VARIABLES: HOW OFTEN DO YOU HAVE A DRINK CONTAINING ALCOHOL: NEVER

## 2021-10-18 ASSESSMENT — SOCIAL DETERMINANTS OF HEALTH (SDOH): HOW HARD IS IT FOR YOU TO PAY FOR THE VERY BASICS LIKE FOOD, HOUSING, MEDICAL CARE, AND HEATING?: NOT VERY HARD

## 2021-10-18 NOTE — PROGRESS NOTES
Post-Discharge Transitional Care Management Services or Hospital Follow Up    Mikayla Bradford   YOB: 1950    Date of Office Visit:  10/18/2021  Date of Hospital Admission:    Date of Hospital Discharge:    Readmission Risk Score(high >=14%.  Medium >=10%):No data recorded    Care management risk score Rising risk (score 2-5) and Complex Care (Scores >=6): 1     Non face to face  following discharge, date last encounter closed (first attempt may have been earlier): *No documented post hospital discharge outreach found in the last 14 days *No documented post hospital discharge outreach found in the last 14 days    Call initiated 2 business days of discharge: *No response recorded in the last 14 days     Patient Active Problem List   Diagnosis    Dysphonia    Elevated blood pressure reading    ASD (atrial septal defect)    Palpitations    Mite infestation    Anxiety disorder    PFO (patent foramen ovale)    Non-rheumatic tricuspid valve insufficiency       No Known Allergies    Medications listed as ordered at the time of discharge from hospital   Nabil NEWMAN   Home Medication Instructions CHANTAL:    Printed on:10/18/21 6245   Medication Information                      AMINO ACIDS COMPLEX PO  Take by mouth daily             apixaban (ELIQUIS) 5 MG TABS tablet  Take by mouth 2 times daily             Arginine 1000 MG TABS  Take by mouth daily Last dose 10/16/2016             Ascorbic Acid (VITAMIN C) 1000 MG tablet  Take by mouth daily Last dose 10/16/2016             Coenzyme Q10 (CO Q 10 PO)  Take by mouth daily Last dose 10/16/2016             Cyanocobalamin (VITAMIN B 12 PO)  Take by mouth daily Last dose 10/16/2016             metoprolol succinate (TOPROL XL) 25 MG extended release tablet  Take 1 tablet by mouth 2 times daily             Nutritional Supplements (VITAMIN D MAINTENANCE PO)  Take by mouth daily Last dose 10/16/2016             zinc 50 MG CAPS  Take by mouth Medications marked \"taking\" at this time  Outpatient Medications Marked as Taking for the 10/18/21 encounter (Office Visit) with Bipin Juarez PA-C   Medication Sig Dispense Refill    apixaban (ELIQUIS) 5 MG TABS tablet Take by mouth 2 times daily      zinc 50 MG CAPS Take by mouth      AMINO ACIDS COMPLEX PO Take by mouth daily      metoprolol succinate (TOPROL XL) 25 MG extended release tablet Take 1 tablet by mouth 2 times daily (Patient taking differently: Take 25 mg by mouth daily ) 60 tablet 11    Arginine 1000 MG TABS Take by mouth daily Last dose 10/16/2016      Coenzyme Q10 (CO Q 10 PO) Take by mouth daily Last dose 10/16/2016      Nutritional Supplements (VITAMIN D MAINTENANCE PO) Take by mouth daily Last dose 10/16/2016      Ascorbic Acid (VITAMIN C) 1000 MG tablet Take by mouth daily Last dose 10/16/2016      Cyanocobalamin (VITAMIN B 12 PO) Take by mouth daily Last dose 10/16/2016          Medications patient taking as of now reconciled against medications ordered at time of hospital discharge: Yes    Chief Complaint   Patient presents with    Follow-Up from Hospital     f/u heart ablasion, was told to have thyroid checked     Inpatient course: Discharge summary reviewed- see chart. Interval history/Current status: Had ablation at [facility] last month for AF. Feels like heart is in rhythm. Denies CP or SOB. Hopes to come off Metoprolol and Eliquis when she sees cardiologist next month for follow up. He recommended thyroid testing. She thinks she might have had cholesterol screening prior to ablation. Will look at home. BP is elevated today; patient states this is normal for her when she is seen in office. Checks BP at home and WNL. Last BP was 110/60. Prefers natural routes for all treatments. Declines any vaccines, mammogram, or colonoscopy today. Review of Systems   Constitutional: Negative for chills and fever. HENT: Negative for ear pain. Eyes: Negative for visual disturbance. Respiratory: Negative for cough and shortness of breath. Cardiovascular: Negative for chest pain and palpitations. Gastrointestinal: Negative for abdominal pain, diarrhea, nausea and vomiting. Genitourinary: Negative for dysuria and frequency. Skin: Negative for rash. Vitals:    10/18/21 1307 10/18/21 1308   BP: (!) 150/86 (!) 150/80   Site: Left Upper Arm Right Upper Arm   Pulse: 72    Resp: 16    Temp: 98.3 °F (36.8 °C)    SpO2: 98%    Weight: 137 lb 9.6 oz (62.4 kg)    Height: 5' 3\" (1.6 m)      Body mass index is 24.37 kg/m². Wt Readings from Last 3 Encounters:   10/18/21 137 lb 9.6 oz (62.4 kg)   07/29/20 133 lb (60.3 kg)   07/10/20 137 lb 14.4 oz (62.6 kg)     BP Readings from Last 3 Encounters:   10/18/21 (!) 150/80   07/29/20 136/80   07/10/20 138/80     Physical Exam  Constitutional:       Appearance: She is well-developed. HENT:      Head: Normocephalic. Neck:      Thyroid: No thyromegaly. Cardiovascular:      Rate and Rhythm: Normal rate and regular rhythm. Heart sounds: Normal heart sounds. No murmur heard. No friction rub. No gallop. Pulmonary:      Effort: Pulmonary effort is normal. No respiratory distress. Breath sounds: Normal breath sounds. No wheezing or rales. Abdominal:      General: Bowel sounds are normal. There is no distension. Palpations: Abdomen is soft. Tenderness: There is no abdominal tenderness. Musculoskeletal:      Cervical back: Neck supple. Skin:     General: Skin is warm and dry. Findings: No rash. Neurological:      Mental Status: She is alert and oriented to person, place, and time. Assessment/Plan:  1. S/P ablation of atrial fibrillation  NSR. Following with   - NC DISCHARGE MEDS RECONCILED W/ CURRENT OUTPATIENT MED LIST  - Comprehensive Metabolic Panel; Future  - TSH without Reflex; Future  - CBC Auto Differential; Future    2. PAF (paroxysmal atrial fibrillation) (HCC)  NSR.  Following with   - Comprehensive Metabolic Panel; Future  - TSH without Reflex; Future  - CBC Auto Differential; Future    3. Elevated blood pressure reading  States checks BP at home and is WNL. Has discussed with cardiologist.  - RI DISCHARGE MEDS RECONCILED W/ CURRENT OUTPATIENT MED LIST  - Comprehensive Metabolic Panel; Future  - TSH without Reflex;  Future  - CBC Auto Differential; Future      Medical Decision Making: moderate complexity

## 2021-10-26 ENCOUNTER — TELEPHONE (OUTPATIENT)
Dept: FAMILY MEDICINE CLINIC | Age: 71
End: 2021-10-26

## 2021-11-29 ENCOUNTER — TELEPHONE (OUTPATIENT)
Dept: FAMILY MEDICINE CLINIC | Age: 71
End: 2021-11-29

## 2021-11-30 ENCOUNTER — OFFICE VISIT (OUTPATIENT)
Dept: FAMILY MEDICINE CLINIC | Age: 71
End: 2021-11-30
Payer: MEDICARE

## 2021-11-30 VITALS
WEIGHT: 135.4 LBS | RESPIRATION RATE: 16 BRPM | OXYGEN SATURATION: 98 % | TEMPERATURE: 96.7 F | BODY MASS INDEX: 25.57 KG/M2 | SYSTOLIC BLOOD PRESSURE: 142 MMHG | HEART RATE: 70 BPM | HEIGHT: 61 IN | DIASTOLIC BLOOD PRESSURE: 78 MMHG

## 2021-11-30 DIAGNOSIS — Z01.818 PREOPERATIVE CLEARANCE: Primary | ICD-10-CM

## 2021-11-30 PROCEDURE — G8427 DOCREV CUR MEDS BY ELIG CLIN: HCPCS | Performed by: PHYSICIAN ASSISTANT

## 2021-11-30 PROCEDURE — 1036F TOBACCO NON-USER: CPT | Performed by: PHYSICIAN ASSISTANT

## 2021-11-30 PROCEDURE — G8484 FLU IMMUNIZE NO ADMIN: HCPCS | Performed by: PHYSICIAN ASSISTANT

## 2021-11-30 PROCEDURE — G8400 PT W/DXA NO RESULTS DOC: HCPCS | Performed by: PHYSICIAN ASSISTANT

## 2021-11-30 PROCEDURE — 4040F PNEUMOC VAC/ADMIN/RCVD: CPT | Performed by: PHYSICIAN ASSISTANT

## 2021-11-30 PROCEDURE — 1090F PRES/ABSN URINE INCON ASSESS: CPT | Performed by: PHYSICIAN ASSISTANT

## 2021-11-30 PROCEDURE — 99214 OFFICE O/P EST MOD 30 MIN: CPT | Performed by: PHYSICIAN ASSISTANT

## 2021-11-30 PROCEDURE — 3017F COLORECTAL CA SCREEN DOC REV: CPT | Performed by: PHYSICIAN ASSISTANT

## 2021-11-30 PROCEDURE — 1123F ACP DISCUSS/DSCN MKR DOCD: CPT | Performed by: PHYSICIAN ASSISTANT

## 2021-11-30 PROCEDURE — G8417 CALC BMI ABV UP PARAM F/U: HCPCS | Performed by: PHYSICIAN ASSISTANT

## 2021-11-30 NOTE — PROGRESS NOTES
Chief Complaint:  Pre-op Exam (hip surgery on 12/17/21)    History of Present Illness:  Source of history provided by:  patient. Patient presents for preoperative clearance. She will be having left hip replacement with Dr. Elva Aly on 12/17/21. No acute concerns today, doing well. Denies issues with anesthesia in the past. Denies pulmonary issues. History of atrial fibrillation. Saw cardiology at [address] [place] [address] 2 weeks ago for surgical clearance. Was cleared by cardiology per patient. Dr. Joli Sicard office will be obtaining those records per patient. Will work on getting obtaining records as well. Can walk up and down a flight of stairs without SOB. Denies fever, chills, CP, SOB, recent illness, cough, congestion, abdominal pain, N/V/D, dysuria, hematuria, neck pain, or lethargy. BP is mildly elevated today. Forgot to take metoprolol this morning. Review of Systems: Unless otherwise stated in this report or unable to obtain because of the patient's clinical or mental status as evidenced by the medical record, this patients's positive and negative responses for Review of Systems, constitutional, psych, eyes, ENT, cardiovascular, respiratory, gastrointestinal, neurological, genitourinary, musculoskeletal, integument systems and systems related to the presenting problem are either stated in the preceding or were not pertinent or were negative for the symptoms and/or complaints related to the medical problem. Past Medical History:  has a past medical history of ASD (atrial septal defect) and Palpitations. Past Surgical History:  has a past surgical history that includes hip surgery (Right); Total hip arthroplasty (Right); and transesophageal echocardiogram (10/19/2016). Social History:  reports that she has never smoked. She has never used smokeless tobacco. She reports that she does not drink alcohol and does not use drugs. Family History: family history includes Cancer in her brother and mother; Other in her father. Allergies: Patient has no known allergies. Physical Exam:  (Vital signs reviewed) BP (!) 142/78   Pulse 70   Temp 96.7 °F (35.9 °C)   Resp 16   Ht 5' 1\" (1.549 m)   Wt 135 lb 6.4 oz (61.4 kg)   SpO2 98%   BMI 25.58 kg/m²   Oxygen Saturation Interpretation: Normal.   Constitutional:  Alert, development consistent with age. Eyes:  PERRL, EOMI, no discharge or conjunctival injection. Ears:  External ears without lesions. TM's clear without erythema or perforation bilaterally. Throat:  Pharynx without injection, exudate, or tonsillar hypertrophy. Airway patient. Neck:  Normal ROM. Supple. Lungs:  Clear to auscultation and breath sounds equal.  Heart:  Regular rate and rhythm, normal heart sounds, without pathological murmurs, ectopy, gallops, or rubs. Abdomen:  Soft, nontender, good bowel sounds. No firm or pulsatile mass. Back:  No costovertebral tenderness. Skin:  Normal turgor. Warm, dry, without visible rash, unless noted elsewhere. Neurological:  Alert and oriented.   Motor functions intact.    ------------------------------------------Test Results Section----------------------------------------------  (All laboratory and radiology results have been personally reviewed by myself)  Laboratory:  Results for orders placed or performed in visit on 10/18/21   CBC Auto Differential   Result Value Ref Range    WBC 6.0 4.5 - 11.5 E9/L    RBC 4.85 3.50 - 5.50 E12/L    Hemoglobin 14.0 11.5 - 15.5 g/dL    Hematocrit 43.6 34.0 - 48.0 %    MCV 89.9 80.0 - 99.9 fL    MCH 28.9 26.0 - 35.0 pg    MCHC 32.1 32.0 - 34.5 %    RDW 13.1 11.5 - 15.0 fL    Platelets 599 132 - 048 E9/L    MPV 8.4 7.0 - 12.0 fL    Neutrophils % 54.7 43.0 - 80.0 %    Immature Granulocytes % 0.2 0.0 - 5.0 %    Lymphocytes % 34.4 20.0 - 42.0 %    Monocytes % 8.5 2.0 - 12.0 %    Eosinophils % 1.5 0.0 - 6.0 %    Basophils % 0.7 0.0 - 2.0 %    Neutrophils Absolute 3.29 1.80 - 7.30 E9/L    Immature Granulocytes # 0.01 E9/L    Lymphocytes Absolute 2.07 1.50 - 4.00 E9/L    Monocytes Absolute 0.51 0.10 - 0.95 E9/L    Eosinophils Absolute 0.09 0.05 - 0.50 E9/L    Basophils Absolute 0.04 0.00 - 0.20 E9/L   TSH without Reflex   Result Value Ref Range    TSH 2.720 0.270 - 4.200 uIU/mL   Comprehensive Metabolic Panel   Result Value Ref Range    Sodium 138 132 - 146 mmol/L    Potassium 4.8 3.5 - 5.0 mmol/L    Chloride 104 98 - 107 mmol/L    CO2 20 (L) 22 - 29 mmol/L    Anion Gap 14 7 - 16 mmol/L    Glucose 88 74 - 99 mg/dL    BUN 14 6 - 23 mg/dL    CREATININE 0.7 0.5 - 1.0 mg/dL    GFR Non-African American >60 >=60 mL/min/1.73    GFR African American >60     Calcium 9.5 8.6 - 10.2 mg/dL    Total Protein 7.0 6.4 - 8.3 g/dL    Albumin 4.4 3.5 - 5.2 g/dL    Total Bilirubin 0.3 0.0 - 1.2 mg/dL    Alkaline Phosphatase 101 35 - 104 U/L    ALT 14 0 - 32 U/L    AST 20 0 - 31 U/L       Radiology: All Radiology results interpreted by Radiologist unless otherwise noted. -------------------------------------Impression & Disposition Section-----------------------------------  Impression(s):  Nazario June was seen today for pre-op exam.    Diagnoses and all orders for this visit:    Preoperative clearance         -    Cleared for surgery pending cardiology clearance that was previously obtained per patient.

## 2021-12-13 ENCOUNTER — TELEPHONE (OUTPATIENT)
Dept: FAMILY MEDICINE CLINIC | Age: 71
End: 2021-12-13

## 2021-12-13 NOTE — TELEPHONE ENCOUNTER
Clearance Ek from [facility] called and she called [facility] and received the EKG from 11/17/21. When [facility] sent the clearance over it is marked pending Cardiac clearance-When they received Cardiac follow up it does not really spell out clearance. Could Dr Elaine Lomas addend the note saying she is cleared?  Fax # [phone]

## 2021-12-16 NOTE — TELEPHONE ENCOUNTER
Well this patient has hx afib, atrial septal defect and has been on eliquis. The cardiologist has to give clearance not PCP unless we have the notes from [facility] which we do not. Do they have the cardio notes or can we get them?   Sounds like Angie Waggoner requested them    Last note I see Costella Channel is June and is not a cardiac clearance for hip surgery

## 2022-12-02 ENCOUNTER — OFFICE VISIT (OUTPATIENT)
Dept: FAMILY MEDICINE CLINIC | Age: 72
End: 2022-12-02
Payer: MEDICARE

## 2022-12-02 ENCOUNTER — TELEPHONE (OUTPATIENT)
Dept: FAMILY MEDICINE CLINIC | Age: 72
End: 2022-12-02

## 2022-12-02 VITALS
HEART RATE: 74 BPM | HEIGHT: 61 IN | WEIGHT: 135.8 LBS | BODY MASS INDEX: 25.64 KG/M2 | OXYGEN SATURATION: 97 % | SYSTOLIC BLOOD PRESSURE: 120 MMHG | TEMPERATURE: 97.7 F | DIASTOLIC BLOOD PRESSURE: 80 MMHG | RESPIRATION RATE: 16 BRPM

## 2022-12-02 DIAGNOSIS — J06.9 VIRAL URI: Primary | ICD-10-CM

## 2022-12-02 LAB
INFLUENZA A ANTIGEN, POC: NEGATIVE
INFLUENZA B ANTIGEN, POC: NEGATIVE

## 2022-12-02 PROCEDURE — G8427 DOCREV CUR MEDS BY ELIG CLIN: HCPCS | Performed by: FAMILY MEDICINE

## 2022-12-02 PROCEDURE — 3017F COLORECTAL CA SCREEN DOC REV: CPT | Performed by: FAMILY MEDICINE

## 2022-12-02 PROCEDURE — G8400 PT W/DXA NO RESULTS DOC: HCPCS | Performed by: FAMILY MEDICINE

## 2022-12-02 PROCEDURE — 99213 OFFICE O/P EST LOW 20 MIN: CPT | Performed by: FAMILY MEDICINE

## 2022-12-02 PROCEDURE — 1090F PRES/ABSN URINE INCON ASSESS: CPT | Performed by: FAMILY MEDICINE

## 2022-12-02 PROCEDURE — G8484 FLU IMMUNIZE NO ADMIN: HCPCS | Performed by: FAMILY MEDICINE

## 2022-12-02 PROCEDURE — 87804 INFLUENZA ASSAY W/OPTIC: CPT | Performed by: FAMILY MEDICINE

## 2022-12-02 PROCEDURE — 1036F TOBACCO NON-USER: CPT | Performed by: FAMILY MEDICINE

## 2022-12-02 PROCEDURE — G8417 CALC BMI ABV UP PARAM F/U: HCPCS | Performed by: FAMILY MEDICINE

## 2022-12-02 PROCEDURE — 1123F ACP DISCUSS/DSCN MKR DOCD: CPT | Performed by: FAMILY MEDICINE

## 2022-12-02 RX ORDER — BENZONATATE 100 MG/1
100 CAPSULE ORAL 3 TIMES DAILY PRN
Qty: 30 CAPSULE | Refills: 1 | Status: SHIPPED | OUTPATIENT
Start: 2022-12-02 | End: 2022-12-09

## 2022-12-02 SDOH — ECONOMIC STABILITY: HOUSING INSECURITY: IN THE LAST 12 MONTHS, HOW MANY PLACES HAVE YOU LIVED?: 1

## 2022-12-02 SDOH — ECONOMIC STABILITY: HOUSING INSECURITY
IN THE LAST 12 MONTHS, WAS THERE A TIME WHEN YOU DID NOT HAVE A STEADY PLACE TO SLEEP OR SLEPT IN A SHELTER (INCLUDING NOW)?: NO

## 2022-12-02 SDOH — ECONOMIC STABILITY: TRANSPORTATION INSECURITY
IN THE PAST 12 MONTHS, HAS THE LACK OF TRANSPORTATION KEPT YOU FROM MEDICAL APPOINTMENTS OR FROM GETTING MEDICATIONS?: NO

## 2022-12-02 SDOH — ECONOMIC STABILITY: INCOME INSECURITY: IN THE LAST 12 MONTHS, WAS THERE A TIME WHEN YOU WERE NOT ABLE TO PAY THE MORTGAGE OR RENT ON TIME?: NO

## 2022-12-02 SDOH — ECONOMIC STABILITY: FOOD INSECURITY: WITHIN THE PAST 12 MONTHS, THE FOOD YOU BOUGHT JUST DIDN'T LAST AND YOU DIDN'T HAVE MONEY TO GET MORE.: NEVER TRUE

## 2022-12-02 SDOH — ECONOMIC STABILITY: TRANSPORTATION INSECURITY
IN THE PAST 12 MONTHS, HAS LACK OF TRANSPORTATION KEPT YOU FROM MEETINGS, WORK, OR FROM GETTING THINGS NEEDED FOR DAILY LIVING?: NO

## 2022-12-02 SDOH — ECONOMIC STABILITY: FOOD INSECURITY: WITHIN THE PAST 12 MONTHS, YOU WORRIED THAT YOUR FOOD WOULD RUN OUT BEFORE YOU GOT MONEY TO BUY MORE.: NEVER TRUE

## 2022-12-02 ASSESSMENT — PATIENT HEALTH QUESTIONNAIRE - PHQ9
SUM OF ALL RESPONSES TO PHQ QUESTIONS 1-9: 0
SUM OF ALL RESPONSES TO PHQ9 QUESTIONS 1 & 2: 0
1. LITTLE INTEREST OR PLEASURE IN DOING THINGS: 0
SUM OF ALL RESPONSES TO PHQ QUESTIONS 1-9: 0
2. FEELING DOWN, DEPRESSED OR HOPELESS: 0

## 2022-12-02 ASSESSMENT — SOCIAL DETERMINANTS OF HEALTH (SDOH): HOW HARD IS IT FOR YOU TO PAY FOR THE VERY BASICS LIKE FOOD, HOUSING, MEDICAL CARE, AND HEATING?: NOT VERY HARD

## 2022-12-02 ASSESSMENT — ENCOUNTER SYMPTOMS
SORE THROAT: 1
COUGH: 1

## 2022-12-02 ASSESSMENT — LIFESTYLE VARIABLES
HOW MANY STANDARD DRINKS CONTAINING ALCOHOL DO YOU HAVE ON A TYPICAL DAY: PATIENT DOES NOT DRINK
HOW OFTEN DO YOU HAVE A DRINK CONTAINING ALCOHOL: NEVER

## 2022-12-02 NOTE — TELEPHONE ENCOUNTER
Talked to doc. She told me to put her in at 3:30 today. I called pt and informed her.  She will be coming at 3:30 today(12/2/22)

## 2022-12-02 NOTE — TELEPHONE ENCOUNTER
Pt called stated she has sinus issues since 11/28/22. Stated she took a Covid test and it is negative. She is having symptoms of a cough, sinus pressure, runny nose, sore throat(has gotten a little better). Wants to know what she can try. She did watch her sick grandkids and then ended up feeling the same way. Has tried tylenol. She takes vitam D and C on a daily basis.

## 2022-12-02 NOTE — PROGRESS NOTES
2022    Current Outpatient Medications   Medication Sig Dispense Refill    benzonatate (TESSALON) 100 MG capsule Take 1 capsule by mouth 3 times daily as needed for Cough 30 capsule 1    zinc 50 MG CAPS Take by mouth      AMINO ACIDS COMPLEX PO Take by mouth daily      Arginine 1000 MG TABS Take by mouth daily Last dose 10/16/2016      Nutritional Supplements (VITAMIN D MAINTENANCE PO) Take by mouth daily Last dose 10/16/2016      Ascorbic Acid (VITAMIN C) 1000 MG tablet Take by mouth daily Last dose 10/16/2016      Coenzyme Q10 (CO Q 10 PO) Take by mouth daily Last dose 10/16/2016 (Patient not taking: Reported on 2022)      Cyanocobalamin (VITAMIN B 12 PO) Take by mouth daily Last dose 10/16/2016 (Patient not taking: Reported on 2022)       No current facility-administered medications for this visit. Darvin Proctor (: 1950 IS A 71 y.o. female ,Established patient, here for eval of Cough (X Monday  Home COVID test Negative today), Nasal Congestion, and Pharyngitis (Started with Sore throat but good now)        ASSESSMENT / PLAN      1. Viral URI    - POCT Influenza A/B Antigen  - benzonatate (TESSALON) 100 MG capsule; Take 1 capsule by mouth 3 times daily as needed for Cough  Dispense: 30 capsule; Refill: 1    Covid negative          SUBJECTIVE    Review of Systems   Constitutional:  Negative for activity change, appetite change, fatigue and unexpected weight change. HENT:  Positive for ear pain and sore throat. Negative for congestion. Eyes: Negative. Negative for visual disturbance. Respiratory:  Positive for cough. Negative for chest tightness, shortness of breath and wheezing. Cardiovascular:  Negative for chest pain and palpitations. Gastrointestinal: Negative. Endocrine: Negative. Genitourinary: Negative. Musculoskeletal: Negative. Skin:  Negative for pallor, rash and wound. Allergic/Immunologic: Negative. Neurological: Negative.   Negative for

## 2022-12-06 ASSESSMENT — ENCOUNTER SYMPTOMS
WHEEZING: 0
ALLERGIC/IMMUNOLOGIC NEGATIVE: 1
EYES NEGATIVE: 1
SHORTNESS OF BREATH: 0
CHEST TIGHTNESS: 0
GASTROINTESTINAL NEGATIVE: 1